# Patient Record
Sex: FEMALE | Race: WHITE | NOT HISPANIC OR LATINO | URBAN - METROPOLITAN AREA
[De-identification: names, ages, dates, MRNs, and addresses within clinical notes are randomized per-mention and may not be internally consistent; named-entity substitution may affect disease eponyms.]

---

## 2019-01-02 ENCOUNTER — APPOINTMENT (RX ONLY)
Dept: URBAN - METROPOLITAN AREA CLINIC 52 | Facility: CLINIC | Age: 54
Setting detail: DERMATOLOGY
End: 2019-01-02

## 2019-01-02 ENCOUNTER — APPOINTMENT (RX ONLY)
Dept: URBAN - METROPOLITAN AREA CLINIC 51 | Facility: CLINIC | Age: 54
Setting detail: DERMATOLOGY
End: 2019-01-02

## 2019-01-02 DIAGNOSIS — L84 CORNS AND CALLOSITIES: ICD-10-CM

## 2019-01-02 DIAGNOSIS — L259 CONTACT DERMATITIS AND OTHER ECZEMA, UNSPECIFIED CAUSE: ICD-10-CM

## 2019-01-02 DIAGNOSIS — L29.8 OTHER PRURITUS: ICD-10-CM

## 2019-01-02 DIAGNOSIS — L29.89 OTHER PRURITUS: ICD-10-CM

## 2019-01-02 PROBLEM — L23.9 ALLERGIC CONTACT DERMATITIS, UNSPECIFIED CAUSE: Status: ACTIVE | Noted: 2019-01-02

## 2019-01-02 PROCEDURE — 99213 OFFICE O/P EST LOW 20 MIN: CPT

## 2019-01-02 PROCEDURE — ? PRESCRIPTION MEDICATION MANAGEMENT

## 2019-01-02 PROCEDURE — ? COUNSELING

## 2019-01-02 PROCEDURE — ? PRESCRIPTION

## 2019-01-02 RX ORDER — CLOBETASOL PROPIONATE 0.5 MG/G
CREAM TOPICAL
Qty: 1 | Refills: 1 | Status: ERX | COMMUNITY
Start: 2019-01-02

## 2019-01-02 RX ADMIN — CLOBETASOL PROPIONATE: 0.5 CREAM TOPICAL at 20:37

## 2019-01-02 ASSESSMENT — LOCATION SIMPLE DESCRIPTION DERM
LOCATION SIMPLE: RIGHT LOWER BACK
LOCATION SIMPLE: PLANTAR SURFACE OF RIGHT 1ST TOE
LOCATION SIMPLE: ABDOMEN
LOCATION SIMPLE: PLANTAR SURFACE OF RIGHT 1ST TOE
LOCATION SIMPLE: ABDOMEN
LOCATION SIMPLE: RIGHT LOWER BACK

## 2019-01-02 ASSESSMENT — LOCATION DETAILED DESCRIPTION DERM
LOCATION DETAILED: RIGHT SUPERIOR MEDIAL MIDBACK
LOCATION DETAILED: EPIGASTRIC SKIN
LOCATION DETAILED: EPIGASTRIC SKIN
LOCATION DETAILED: RIGHT MEDIAL PLANTAR 1ST TOE
LOCATION DETAILED: RIGHT MEDIAL PLANTAR 1ST TOE
LOCATION DETAILED: RIGHT SUPERIOR MEDIAL MIDBACK

## 2019-01-02 ASSESSMENT — SEVERITY ASSESSMENT
SEVERITY: MILD
SEVERITY: MILD

## 2019-01-02 ASSESSMENT — ITCH INTENSITY
HOW SEVERE IS YOUR ITCHING?: 2
HOW SEVERE IS YOUR ITCHING?: 2

## 2019-01-02 ASSESSMENT — LOCATION ZONE DERM
LOCATION ZONE: TOE
LOCATION ZONE: TRUNK
LOCATION ZONE: TOE
LOCATION ZONE: TRUNK

## 2019-01-02 NOTE — PROCEDURE: PRESCRIPTION MEDICATION MANAGEMENT
Detail Level: Zone
Continue Regimen: Hydroxyzine from previous doctor
Render In Strict Bullet Format?: No

## 2019-01-02 NOTE — PROCEDURE: PRESCRIPTION MEDICATION MANAGEMENT
Continue Regimen: Hydroxyzine from previous doctor
Render In Strict Bullet Format?: No
Detail Level: Zone

## 2023-10-24 ENCOUNTER — OFFICE VISIT (OUTPATIENT)
Dept: URBAN - METROPOLITAN AREA SURGERY CENTER 23 | Facility: SURGERY CENTER | Age: 58
End: 2023-10-24

## 2023-10-24 ENCOUNTER — CLAIMS CREATED FROM THE CLAIM WINDOW (OUTPATIENT)
Dept: URBAN - METROPOLITAN AREA SURGERY CENTER 23 | Facility: SURGERY CENTER | Age: 58
End: 2023-10-24
Payer: COMMERCIAL

## 2023-10-24 ENCOUNTER — CLAIMS CREATED FROM THE CLAIM WINDOW (OUTPATIENT)
Dept: URBAN - METROPOLITAN AREA CLINIC 4 | Facility: CLINIC | Age: 58
End: 2023-10-24
Payer: COMMERCIAL

## 2023-10-24 DIAGNOSIS — Z83.710 FAMILY HISTORY OF ADENOMATOUS AND SERRATED POLYPS: ICD-10-CM

## 2023-10-24 DIAGNOSIS — D12.3 BENIGN NEOPLASM OF TRANSVERSE COLON: ICD-10-CM

## 2023-10-24 DIAGNOSIS — Z12.11 COLON CANCER SCREENING: ICD-10-CM

## 2023-10-24 DIAGNOSIS — K63.5 COLONIC POLYPS: ICD-10-CM

## 2023-10-24 DIAGNOSIS — K64.8 OTHER HEMORRHOIDS: ICD-10-CM

## 2023-10-24 DIAGNOSIS — K63.89 OTHER SPECIFIED DISEASES OF INTESTINE: ICD-10-CM

## 2023-10-24 DIAGNOSIS — D12.2 ADENOMA OF ASCENDING COLON: ICD-10-CM

## 2023-10-24 DIAGNOSIS — K57.30 DIVERTICULA OF COLON: ICD-10-CM

## 2023-10-24 DIAGNOSIS — D12.5 ADENOMA OF SIGMOID COLON: ICD-10-CM

## 2023-10-24 DIAGNOSIS — K63.89 MELANOSIS OF COLON: ICD-10-CM

## 2023-10-24 DIAGNOSIS — Z83.719 FAMILY HISTORY OF POLYPS IN THE COLON: ICD-10-CM

## 2023-10-24 DIAGNOSIS — D12.3 ADENOMA OF TRANSVERSE COLON: ICD-10-CM

## 2023-10-24 PROCEDURE — G8907 PT DOC NO EVENTS ON DISCHARG: HCPCS | Performed by: INTERNAL MEDICINE

## 2023-10-24 PROCEDURE — 00811 ANES LWR INTST NDSC NOS: CPT | Performed by: NURSE ANESTHETIST, CERTIFIED REGISTERED

## 2023-10-24 PROCEDURE — 45385 COLONOSCOPY W/LESION REMOVAL: CPT | Performed by: INTERNAL MEDICINE

## 2023-10-24 PROCEDURE — 88305 TISSUE EXAM BY PATHOLOGIST: CPT | Performed by: PATHOLOGY

## 2023-12-05 ENCOUNTER — OFFICE VISIT (OUTPATIENT)
Dept: URBAN - METROPOLITAN AREA CLINIC 118 | Facility: CLINIC | Age: 58
End: 2023-12-05